# Patient Record
Sex: FEMALE | URBAN - METROPOLITAN AREA
[De-identification: names, ages, dates, MRNs, and addresses within clinical notes are randomized per-mention and may not be internally consistent; named-entity substitution may affect disease eponyms.]

---

## 2017-12-08 ENCOUNTER — IMPORTED ENCOUNTER (OUTPATIENT)
Dept: URBAN - METROPOLITAN AREA CLINIC 50 | Facility: CLINIC | Age: 72
End: 2017-12-08

## 2018-01-02 ENCOUNTER — IMPORTED ENCOUNTER (OUTPATIENT)
Dept: URBAN - METROPOLITAN AREA CLINIC 50 | Facility: CLINIC | Age: 73
End: 2018-01-02

## 2019-02-28 NOTE — PATIENT DISCUSSION
"Recommended artificial tears to use: 1 drop 4x a day in both eyes as needed, Refresh Optive recommended brand. Advised to take Omega 3 fatty acids, like Flaxseed Oil, in supplements. 2-4 grams a day. (Takes 2 months to ""kick in."") Recommended warm compresses 10 minutes once a day. (Takes 2 weeks to ""kick in.""). "

## 2021-04-23 ASSESSMENT — TONOMETRY
OS_IOP_MMHG: 14
OD_IOP_MMHG: 14

## 2021-04-23 ASSESSMENT — VISUAL ACUITY
OD_SC: 20/40-
OS_CC: J1
OD_CC: J1
OS_SC: 20/30-